# Patient Record
Sex: FEMALE | Race: WHITE | Employment: FULL TIME | URBAN - METROPOLITAN AREA
[De-identification: names, ages, dates, MRNs, and addresses within clinical notes are randomized per-mention and may not be internally consistent; named-entity substitution may affect disease eponyms.]

---

## 2022-02-14 ENCOUNTER — HOSPITAL ENCOUNTER (OUTPATIENT)
Dept: NUTRITION | Age: 27
Discharge: HOME | End: 2022-02-14
Payer: SELF-PAY

## 2022-02-14 PROCEDURE — 97802 MEDICAL NUTRITION INDIV IN: CPT

## 2022-02-14 NOTE — PROGRESS NOTES
Sofya Cameron, RD, LD  Outpatient Registered Dietitian  [facility] Outpatient Nutrition Counseling  Phone: [phone]  Fax: [phone]    ASSESSMENT  Pt is a 31 y.o. female referred with the following diagnosis (es):  Celiac disease [K90.0]     Patient stated goal: identify additional food choices for meals that are simple and compliant with GFD, adequate folic acid to support healthy pregnancy given malabsorption     Diet Recall: no tracked meals, therefore recall is limited. Relies predominately on frozen meals, will eat out at [employer] or at [employer] restaurants, steak, venison, GF cereal.    Barriers to change: nutrition knowledge deficit and established food preferences/eating behaviors  Lifestyle/Family Influence/Support: works night shift, has 1year old, recently found out she is pregnant - 1st trimester  Physical Activity: unknown  Sleep Habits: unknown  Stage of Change: Action. Anthropometrics:   Ht: 5'4\"   Wt: 105#     Estimated Nutrition Needs:  EEN: MSJ x1.2   (1435kcal/d)      Protein: 72g/day (20%)     DIAGNOSIS:  Unbalanced diet related to nutrition related knowledge deficit as evidenced by pt with questions on GFD. INTERVENTIONS:  1. 30 minute appointment    2. Recommendations: Gluten Free Diet    3. Nutrition Education and Counseling:   Discussed changes made thus far. Reviewed label reading for identifying GF products, as well as resources for grocery shopping. Identified areas for change and discussed strategies for avoiding cross contamination when cooking at home and dining out. Discussed increasing nutrient density of meals and food choice variety using simple meals- provided resources.  Voiced concern about malabsorption impacting pregnancy, stated she had not received guidance from a provider on this yet. Called and LVM to discuss with both [address] prior to advising the patient.     Materials Provided and Discussed:  - Tips for Avoiding Cross Contamination with Celiac Disease  - Reading Food Labels to Identify GF Products   - 4 week GF meal plan with grocery lists     Behavior change strategies utilized: motivational interviewing and problem solving. Pt verbalized understanding of recommendations discussed. Anticipate fair compliance with recommendations. Goal: GFD Adherence. Coordination of Care: LVM with [address] [place] [address], and patient requesting call back to discuss folic acid supplementation. MONITORING & EVALUATION:  Monitor food/nutrient intake, weight and lab values  Follow Up: Not set. Provided RD contact information should pt wish to schedule follow up. Addendum 2/17/22: Spoke with Kusum Stubbs from [facility] on 2/15 to discuss recommendation of increasing folic acid supplementation to 1000mcg/d, given celiac disease and concern for malabsorption. Kusum Stubbs returned my call and left a VM stating the folic acid from the patient's prenatal multivitamin is sufficient for pregnancy on 2/16. Left VM with patient requesting call back to discuss on 2/17-will defer to [facility] for supplementation management.

## 2022-02-15 VITALS — HEIGHT: 64 IN

## 2022-05-06 ENCOUNTER — TELEPHONE (OUTPATIENT)
Dept: NUTRITION | Age: 27
End: 2022-05-06

## 2022-05-06 NOTE — TELEPHONE ENCOUNTER
Nutrition Counseling: Contacted pt regarding referral. See notes documented in Nutrition Counseling Referral for details. No further follow-up contact from pt. Will close referral for this office and notify referring provider.     [facility]  [phone]